# Patient Record
Sex: FEMALE | Race: WHITE | Employment: PART TIME
[De-identification: names, ages, dates, MRNs, and addresses within clinical notes are randomized per-mention and may not be internally consistent; named-entity substitution may affect disease eponyms.]

---

## 2017-09-10 ENCOUNTER — HEALTH MAINTENANCE LETTER (OUTPATIENT)
Age: 32
End: 2017-09-10

## 2018-08-15 ENCOUNTER — VIRTUAL VISIT (OUTPATIENT)
Dept: FAMILY MEDICINE | Facility: OTHER | Age: 33
End: 2018-08-15

## 2018-08-15 NOTE — PROGRESS NOTES
"Date:   Clinician: Radha Ronquillo  Clinician NPI: [id number]  Patient: Kandy De Souza  Patient : 1985  Patient Address: [address] [place] [address] [address], [place] [address]  Patient Phone: [phone]  Visit Protocol: Motion sickness  Patient Summary:  Kandy is a 33 year old ( : 1985 } female who initiated a Visit for motion sickness prevention.  When asked the question \"Please sign me up to receive news, health information and promotions. \", Kandy responded \"No\".    She plans to travel by airplane, travel by boat or sea, and travel by bus or train. Treatment is requested for 11-20 days. During this time Kandy expects to hike or rock climb and drive a car, boat, or jet ski.  Kandy has experienced motion sickness in the past while traveling by plane, boating or traveling by sea, and traveling by bus or train.   Kandy has been previously treated for motion sickness. The previous bouts of motion sickness have included malaise, dizziness, and nausea or vomiting.   She does not have a history of migraines. Kandy denies hypertension.   The patient denies taking potassium or vitamin c supplements. She also denies glaucoma, currently experiencing dizziness, nausea, a recent concussion or head injury, tinnitus, previous diagnosis or treatment for stroke or neurological disorder, and generally feeling ill on a regular basis.   She denies pregnancy and denies breastfeeding. She has menstruated in the past month.   Kandy does not smoke or use smokeless tobacco    Previous Medications:  Dramamine (dimenhydrinate) was NOT effective.   Transderm Scop patch (scopolamine transdermal) was effective.     Drug Preferences:  Prefers Transderm Scop patch (scopolamine transdermal).   MEDICATIONS: sertraline oral, ALLERGIES: tobramycin  Clinician Response:  Dear Kandy,  Based upon the information you provided, you most likely have motion sickness.  I am prescribing:  Scopolamine base " (Transderm-Scop) 1 mg transdermal patch. Apply 1 patch 4 hours before travel, then every 3 days as needed. There are no refills with this prescription.  Do NOT combine over-the-counter motion-sickness medications with your prescription medications.  Once motion sickness symptoms start, it can take 36-72 hours to feel better, even if the motion has stopped.  If you become pregnant during this course of treatment with medication, stop taking the medication and contact your primary care provider.  It is easier to prevent motion sickness, than to relieve the symptoms after they start. For prevention or mild symptoms try the following:     Eat a few dry soda crackers.    Sip on small amounts of clear, carbonated beverages such as ginger ale    Get fresh air    Lie down or keep your head still    Sit or lay in the most stable area of the vehicle. In a plane, try to sit over a wing. When in a car, you should be the . If you cannot drive, you should be the front seat passenger and concentrate on the horizon. When traveling by ship, the most stable area is near the middle, so try to book a cabin in that area. You should also stay on the deck looking at the horizon or lay down your cabin or in another area near the center of the ship.     Please seek medical attention if you have taken steps to help prevent motion sickness and you develop active motion sickness, your motion sickness does not resolve, you develop new or concerning symptoms (such as headache, weakness), or you develop nausea and vomiting and you are unable to keep down fluids.   Diagnosis: Motion sickness  Diagnosis ICD: T75.3XXA  Prescription: scopolamine base (Transderm-Scop) 1 mg over 3 days transdermal patch 3 day 4 1 patch box, 12 days supply. Apply 1 patch 4 hours before travel, then every 3 days as needed. Refills: 0, Refill as needed: no, Allow substitutions: yes  Pharmacy: [id number] - [phone] - [address], [place] [phone]

## 2019-11-08 ENCOUNTER — HEALTH MAINTENANCE LETTER (OUTPATIENT)
Age: 34
End: 2019-11-08

## 2020-02-23 ENCOUNTER — HEALTH MAINTENANCE LETTER (OUTPATIENT)
Age: 35
End: 2020-02-23

## 2020-12-06 ENCOUNTER — HEALTH MAINTENANCE LETTER (OUTPATIENT)
Age: 35
End: 2020-12-06

## 2021-09-25 ENCOUNTER — HEALTH MAINTENANCE LETTER (OUTPATIENT)
Age: 36
End: 2021-09-25

## 2022-01-15 ENCOUNTER — HEALTH MAINTENANCE LETTER (OUTPATIENT)
Age: 37
End: 2022-01-15

## 2023-01-07 ENCOUNTER — HEALTH MAINTENANCE LETTER (OUTPATIENT)
Age: 38
End: 2023-01-07